# Patient Record
Sex: MALE | Race: WHITE | HISPANIC OR LATINO | Employment: UNEMPLOYED | URBAN - METROPOLITAN AREA
[De-identification: names, ages, dates, MRNs, and addresses within clinical notes are randomized per-mention and may not be internally consistent; named-entity substitution may affect disease eponyms.]

---

## 2020-04-14 ENCOUNTER — HOSPITAL ENCOUNTER (OUTPATIENT)
Facility: HOSPITAL | Age: 55
Discharge: HOME | End: 2020-04-15
Attending: EMERGENCY MEDICINE | Admitting: ORTHOPAEDIC SURGERY
Payer: OTHER GOVERNMENT

## 2020-04-14 DIAGNOSIS — M79.5 FOREIGN BODY (FB) IN SOFT TISSUE: ICD-10-CM

## 2020-04-14 DIAGNOSIS — L03.019 CELLULITIS OF THUMB, UNSPECIFIED LATERALITY: Primary | ICD-10-CM

## 2020-04-14 LAB
ANION GAP SERPL CALC-SCNC: 13 MMOL/L (ref 8–16)
BASOPHILS # BLD AUTO: 0.06 K/UL (ref 0–0.2)
BASOPHILS NFR BLD: 0.5 % (ref 0–1.9)
BUN SERPL-MCNC: 13 MG/DL (ref 6–20)
CALCIUM SERPL-MCNC: 9.5 MG/DL (ref 8.7–10.5)
CHLORIDE SERPL-SCNC: 99 MMOL/L (ref 95–110)
CO2 SERPL-SCNC: 25 MMOL/L (ref 23–29)
CREAT SERPL-MCNC: 0.6 MG/DL (ref 0.5–1.4)
DIFFERENTIAL METHOD: ABNORMAL
EOSINOPHIL # BLD AUTO: 0.8 K/UL (ref 0–0.5)
EOSINOPHIL NFR BLD: 6.8 % (ref 0–8)
ERYTHROCYTE [DISTWIDTH] IN BLOOD BY AUTOMATED COUNT: 11.9 % (ref 11.5–14.5)
EST. GFR  (AFRICAN AMERICAN): >60 ML/MIN/1.73 M^2
EST. GFR  (NON AFRICAN AMERICAN): >60 ML/MIN/1.73 M^2
GLUCOSE SERPL-MCNC: 124 MG/DL (ref 70–110)
GLUCOSE SERPL-MCNC: 156 MG/DL (ref 70–110)
HCT VFR BLD AUTO: 43.4 % (ref 40–54)
HGB BLD-MCNC: 14.6 G/DL (ref 14–18)
IMM GRANULOCYTES # BLD AUTO: 0.08 K/UL (ref 0–0.04)
IMM GRANULOCYTES NFR BLD AUTO: 0.7 % (ref 0–0.5)
LYMPHOCYTES # BLD AUTO: 2.2 K/UL (ref 1–4.8)
LYMPHOCYTES NFR BLD: 18.4 % (ref 18–48)
MCH RBC QN AUTO: 28.4 PG (ref 27–31)
MCHC RBC AUTO-ENTMCNC: 33.6 G/DL (ref 32–36)
MCV RBC AUTO: 84 FL (ref 82–98)
MONOCYTES # BLD AUTO: 0.9 K/UL (ref 0.3–1)
MONOCYTES NFR BLD: 7.3 % (ref 4–15)
NEUTROPHILS # BLD AUTO: 8.1 K/UL (ref 1.8–7.7)
NEUTROPHILS NFR BLD: 66.3 % (ref 38–73)
NRBC BLD-RTO: 0 /100 WBC
PLATELET # BLD AUTO: 206 K/UL (ref 150–350)
PMV BLD AUTO: 12.2 FL (ref 9.2–12.9)
POTASSIUM SERPL-SCNC: 3.7 MMOL/L (ref 3.5–5.1)
RBC # BLD AUTO: 5.14 M/UL (ref 4.6–6.2)
SARS-COV-2 RDRP RESP QL NAA+PROBE: NEGATIVE
SODIUM SERPL-SCNC: 137 MMOL/L (ref 136–145)
WBC # BLD AUTO: 12.17 K/UL (ref 3.9–12.7)

## 2020-04-14 PROCEDURE — 63600175 PHARM REV CODE 636 W HCPCS: Mod: JG | Performed by: INTERNAL MEDICINE

## 2020-04-14 PROCEDURE — 96375 TX/PRO/DX INJ NEW DRUG ADDON: CPT

## 2020-04-14 PROCEDURE — 36415 COLL VENOUS BLD VENIPUNCTURE: CPT

## 2020-04-14 PROCEDURE — 85025 COMPLETE CBC W/AUTO DIFF WBC: CPT

## 2020-04-14 PROCEDURE — G0378 HOSPITAL OBSERVATION PER HR: HCPCS

## 2020-04-14 PROCEDURE — 25000003 PHARM REV CODE 250: Performed by: INTERNAL MEDICINE

## 2020-04-14 PROCEDURE — 87040 BLOOD CULTURE FOR BACTERIA: CPT

## 2020-04-14 PROCEDURE — 96366 THER/PROPH/DIAG IV INF ADDON: CPT

## 2020-04-14 PROCEDURE — 90471 IMMUNIZATION ADMIN: CPT | Performed by: EMERGENCY MEDICINE

## 2020-04-14 PROCEDURE — 80048 BASIC METABOLIC PNL TOTAL CA: CPT

## 2020-04-14 PROCEDURE — 96365 THER/PROPH/DIAG IV INF INIT: CPT

## 2020-04-14 PROCEDURE — 90715 TDAP VACCINE 7 YRS/> IM: CPT | Performed by: EMERGENCY MEDICINE

## 2020-04-14 PROCEDURE — U0002 COVID-19 LAB TEST NON-CDC: HCPCS

## 2020-04-14 PROCEDURE — 63600175 PHARM REV CODE 636 W HCPCS: Performed by: EMERGENCY MEDICINE

## 2020-04-14 PROCEDURE — 99285 EMERGENCY DEPT VISIT HI MDM: CPT | Mod: 25

## 2020-04-14 RX ORDER — POTASSIUM CHLORIDE 7.45 MG/ML
40 INJECTION INTRAVENOUS
Status: DISCONTINUED | OUTPATIENT
Start: 2020-04-14 | End: 2020-04-15 | Stop reason: HOSPADM

## 2020-04-14 RX ORDER — SODIUM CHLORIDE 0.9 % (FLUSH) 0.9 %
10 SYRINGE (ML) INJECTION
Status: DISCONTINUED | OUTPATIENT
Start: 2020-04-14 | End: 2020-04-15 | Stop reason: HOSPADM

## 2020-04-14 RX ORDER — CALCIUM CHLORIDE IN 0.9 % NACL 1 G/100 ML
1 INTRAVENOUS SOLUTION, PIGGYBACK (ML) INTRAVENOUS
Status: DISCONTINUED | OUTPATIENT
Start: 2020-04-14 | End: 2020-04-15 | Stop reason: HOSPADM

## 2020-04-14 RX ORDER — POTASSIUM CHLORIDE 20 MEQ/1
20 TABLET, EXTENDED RELEASE ORAL
Status: DISCONTINUED | OUTPATIENT
Start: 2020-04-14 | End: 2020-04-15 | Stop reason: HOSPADM

## 2020-04-14 RX ORDER — HYDROCODONE BITARTRATE AND ACETAMINOPHEN 5; 325 MG/1; MG/1
1 TABLET ORAL EVERY 6 HOURS PRN
Status: DISCONTINUED | OUTPATIENT
Start: 2020-04-14 | End: 2020-04-15 | Stop reason: HOSPADM

## 2020-04-14 RX ORDER — POTASSIUM CHLORIDE 7.45 MG/ML
20 INJECTION INTRAVENOUS
Status: DISCONTINUED | OUTPATIENT
Start: 2020-04-14 | End: 2020-04-15 | Stop reason: HOSPADM

## 2020-04-14 RX ORDER — VANCOMYCIN HCL IN 5 % DEXTROSE 1G/250ML
1000 PLASTIC BAG, INJECTION (ML) INTRAVENOUS ONCE
Status: DISCONTINUED | OUTPATIENT
Start: 2020-04-14 | End: 2020-04-15 | Stop reason: HOSPADM

## 2020-04-14 RX ORDER — MAGNESIUM SULFATE HEPTAHYDRATE 40 MG/ML
4 INJECTION, SOLUTION INTRAVENOUS
Status: DISCONTINUED | OUTPATIENT
Start: 2020-04-14 | End: 2020-04-15 | Stop reason: HOSPADM

## 2020-04-14 RX ORDER — MAGNESIUM SULFATE HEPTAHYDRATE 40 MG/ML
2 INJECTION, SOLUTION INTRAVENOUS
Status: DISCONTINUED | OUTPATIENT
Start: 2020-04-14 | End: 2020-04-15 | Stop reason: HOSPADM

## 2020-04-14 RX ORDER — ACETAMINOPHEN 325 MG/1
650 TABLET ORAL EVERY 4 HOURS PRN
Status: DISCONTINUED | OUTPATIENT
Start: 2020-04-14 | End: 2020-04-15 | Stop reason: HOSPADM

## 2020-04-14 RX ORDER — ONDANSETRON 2 MG/ML
4 INJECTION INTRAMUSCULAR; INTRAVENOUS EVERY 8 HOURS PRN
Status: DISCONTINUED | OUTPATIENT
Start: 2020-04-14 | End: 2020-04-15 | Stop reason: HOSPADM

## 2020-04-14 RX ORDER — POTASSIUM CHLORIDE 20 MEQ/1
40 TABLET, EXTENDED RELEASE ORAL
Status: DISCONTINUED | OUTPATIENT
Start: 2020-04-14 | End: 2020-04-15 | Stop reason: HOSPADM

## 2020-04-14 RX ORDER — MAGNESIUM SULFATE 1 G/100ML
1 INJECTION INTRAVENOUS
Status: DISCONTINUED | OUTPATIENT
Start: 2020-04-14 | End: 2020-04-15 | Stop reason: HOSPADM

## 2020-04-14 RX ORDER — LANOLIN ALCOHOL/MO/W.PET/CERES
800 CREAM (GRAM) TOPICAL
Status: DISCONTINUED | OUTPATIENT
Start: 2020-04-14 | End: 2020-04-15 | Stop reason: HOSPADM

## 2020-04-14 RX ORDER — VANCOMYCIN HCL IN 5 % DEXTROSE 1G/250ML
1000 PLASTIC BAG, INJECTION (ML) INTRAVENOUS ONCE
Status: COMPLETED | OUTPATIENT
Start: 2020-04-14 | End: 2020-04-14

## 2020-04-14 RX ADMIN — TETANUS IMMUNE GLOBULIN (HUMAN) 250 UNITS: 250 INJECTION INTRAMUSCULAR at 09:04

## 2020-04-14 RX ADMIN — PIPERACILLIN SODIUM AND TAZOBACTAM SODIUM 3.38 G: 3; .375 INJECTION, POWDER, LYOPHILIZED, FOR SOLUTION INTRAVENOUS at 05:04

## 2020-04-14 RX ADMIN — POTASSIUM CHLORIDE 20 MEQ: 20 TABLET, EXTENDED RELEASE ORAL at 09:04

## 2020-04-14 RX ADMIN — HYDROCODONE BITARTRATE AND ACETAMINOPHEN 1 TABLET: 5; 325 TABLET ORAL at 09:04

## 2020-04-14 RX ADMIN — VANCOMYCIN HYDROCHLORIDE 500 MG: 500 INJECTION, POWDER, LYOPHILIZED, FOR SOLUTION INTRAVENOUS at 05:04

## 2020-04-14 RX ADMIN — VANCOMYCIN HYDROCHLORIDE 1000 MG: 1 INJECTION, POWDER, LYOPHILIZED, FOR SOLUTION INTRAVENOUS at 05:04

## 2020-04-14 RX ADMIN — CLOSTRIDIUM TETANI TOXOID ANTIGEN (FORMALDEHYDE INACTIVATED), CORYNEBACTERIUM DIPHTHERIAE TOXOID ANTIGEN (FORMALDEHYDE INACTIVATED), BORDETELLA PERTUSSIS TOXOID ANTIGEN (GLUTARALDEHYDE INACTIVATED), BORDETELLA PERTUSSIS FILAMENTOUS HEMAGGLUTININ ANTIGEN (FORMALDEHYDE INACTIVATED), BORDETELLA PERTUSSIS PERTACTIN ANTIGEN, AND BORDETELLA PERTUSSIS FIMBRIAE 2/3 ANTIGEN 0.5 ML: 5; 2; 2.5; 5; 3; 5 INJECTION, SUSPENSION INTRAMUSCULAR at 05:04

## 2020-04-14 NOTE — ED NOTES
LOC: The patient is awake, alert and aware of environment with an appropriate affect, the patient is oriented x 3 and speaking appropriately.  APPEARANCE: Patient resting comfortably and in no acute distress, patient is clean and well groomed, patient's clothing properly fastened.  SKIN: The skin is warm and dry, color consistent with ethnicity, patient has normal skin turgor and moist mucus membranes, skin intact, no breakdown or brusing noted. Swelling noted to R thumb.  MUSKULOSKELETAL: Patient moving all extremities well, no obvious swelling or deformities noted.  RESPIRATORY: Airway is open and patent, respirations are spontaneous, patient has a normal effort and rate, no accessory muscle use noted.  CARDIAC: Patient has a normal rate and rhythm, no peripheral edema noted, capillary refill < 3 seconds.  ABDOMEN: Soft and non tender to palpation, no distention noted.  NEUROLOGIC: PERRL, 3mm bilaterally, eyes open spontaneously, behavior appropriate to situation, follows commands, facial expression symmetrical, bilateral hand grasp equal and even, purposeful motor response noted, normal sensation in all extremities when touched with a finger.

## 2020-04-14 NOTE — ED PROVIDER NOTES
Encounter Date: 4/14/2020       History     Chief Complaint   Patient presents with    FINGER SWELLING     SPLINTER TO RT THUMB X 8 DAYS AGO     54-year-old  male was a benign past medical history, presents emergency room with complaints of swelling and pain to his right thumb of 8 days duration.  The patient states that with his initial injury he had a piece of wood impaled in the volar aspect of the thumb overlying the proximal phalanx which was removed but may have very well had a retained piece.  Since that time he has had increasing pain and swelling to that area with limited range of motion.  No lymphangitis or fever.  The patient is not diabetic.  No complaints of any pain to the proximal forearm.  No complaints of pain to the remaining fingers or metacarpal area.  Tetanus status is not up-to-date.        Review of patient's allergies indicates:  No Known Allergies  Past Medical History:   Diagnosis Date    Diabetes mellitus      No past surgical history on file.  No family history on file.  Social History     Tobacco Use    Smoking status: Never Smoker   Substance Use Topics    Alcohol use: No    Drug use: No     Review of Systems   Constitutional: Negative for chills and fever.   Musculoskeletal: Positive for arthralgias and joint swelling.   Skin: Positive for wound.   All other systems reviewed and are negative.      Physical Exam     Initial Vitals [04/14/20 1456]   BP Pulse Resp Temp SpO2   (!) 167/87 89 16 98.8 °F (37.1 °C) 98 %      MAP       --         Physical Exam    Musculoskeletal:        Right hand: Right thumb: Exhibits decreased ROM, swelling and tenderness. Injuries: foreign body and puncture wound. Right index finger: Right index finger - findings: Normal. Right middle finger: Right middle finger - findings: Normal. Right ring finger: Right ring finger - findings: Normal. Right little finger: Right little finger - findings: Normal.         ED Course   Procedures  Labs Reviewed -  No data to display       Imaging Results          US Soft Tissue Head Neck Thyroid (In process)                  Medical Decision Making:   ED Management:  Patient consulted Dr. Reeder who will see the patient in the morning.  Agrees with IV antibiotics.              Attending Attestation:             Attending ED Notes:   The patient's care is being transition to Dr. Paulson                        Clinical Impression:       ICD-10-CM ICD-9-CM   1. Cellulitis of thumb, unspecified laterality L03.019 681.00   2. Foreign body (FB) in soft tissue M79.5 729.6                                Dante Paulson MD  04/14/20 175

## 2020-04-14 NOTE — H&P
"[facility] Medicine H&P    Date of Admit: 4/14/2020  Date of Transfer: 4/14/2020      Subjective:      History of Present Illness / Brief Hospital Course:  Fareed Mcneal is a 54 y.o. male who  has a past medical history of Diabetes mellitus.. The patient presented to the on 4/14/2020 with a primary complaint of FINGER SWELLING (SPLINTER TO RT THUMB X 8 DAYS AGO)      The patient was in their usual state of health until about 1 week ago when he was working and got a splinter in his thumb. He did initially have it removed but since then has had progressive pain, swelling, and erythema to his proximal thumb.   He received a tdap vaccine in the ED. He does not know if he's ever received the vaccination before.   Denies fevers/chills, cp, sob, n/v/d, abd pain.        Past Medical History:  Past Medical History:   Diagnosis Date    Diabetes mellitus      Past Surgical History:  No past surgical history on file.    Allergies:  Review of patient's allergies indicates:  No Known Allergies    Home Medications:  Prior to Admission medications    Medication Sig Start Date End Date Taking? Authorizing Provider   blood sugar diagnostic Strp 1 each by Misc.(Non-Drug; Combo Route) route 2 hours after meals and at bedtime. 3/3/15   Linus De La Rosa MD   blood-glucose meter kit Use as instructed 3/3/15 3/2/16  Linus De La Rosa MD   hydrocodone-acetaminophen 5-325mg (NORCO) 5-325 mg per tablet Take 1 tablet by mouth every 6 (six) hours as needed for Pain. 3/3/15   Linus De La Rosa MD   insulin NPH-insulin regular, 70/30, (NOVOLIN 70/30) 100 unit/mL (70-30) injection Inject 16 Units into the skin 2 (two) times daily before meals. 3/3/15 3/2/16  Linus De La Rosa MD   insulin syringe-needle U-100 (BD INSULIN SYRINGE ULTRA-FINE) 1 mL 31 x 5/16" Syrg 1 each by Misc.(Non-Drug; Combo Route) route 2 (two) times daily with meals. 3/3/15   Linus De La Rosa MD   lancets (ONE TOUCH ULTRASOFT LANCETS) Misc 1 each by Misc.(Non-Drug; Combo " "Route) route 2 hours after meals and at bedtime. 3/3/15   Linus De La Rosa MD   metformin (GLUCOPHAGE) 500 MG tablet Take 1 tablet (500 mg total) by mouth 2 (two) times daily with meals. 3/3/15 3/2/16  Linus De La Rosa MD       Family History:  Reviewed, non-contributory     Social History:  Social History     Tobacco Use    Smoking status: Never Smoker   Substance Use Topics    Alcohol use: No    Drug use: No       Review of Systems:  Pertinent items are noted in HPI. All other systems are reviewed and are negative.     Objective:   Last 24 Hour Vital Signs:  BP  Min: 157/91  Max: 174/96  Temp  Av.8 °F (37.1 °C)  Min: 98.8 °F (37.1 °C)  Max: 98.8 °F (37.1 °C)  Pulse  Av  Min: 89  Max: 91  Resp  Av  Min: 16  Max: 16  SpO2  Av.8 %  Min: 98 %  Max: 99 %  Height  Av' 5" (165.1 cm)  Min: 5' 5" (165.1 cm)  Max: 5' 5" (165.1 cm)  Weight  Av.4 kg (175 lb)  Min: 79.4 kg (175 lb)  Max: 79.4 kg (175 lb)  Body mass index is 29.12 kg/m².  No intake/output data recorded.    Physical Examination:  Physical Exam   Constitutional: He is oriented to person, place, and time. He appears well-developed and well-nourished.   HENT:   Head: Normocephalic and atraumatic.   Eyes: Pupils are equal, round, and reactive to light. No scleral icterus.   Neck: Normal range of motion.   Cardiovascular: Normal rate and regular rhythm.   Pulmonary/Chest: Effort normal. No respiratory distress.   Abdominal: Soft. He exhibits no distension. There is no tenderness.   Musculoskeletal:   R thumb with mild swelling and erythema, no drainage  +TTP   Neurological: He is alert and oriented to person, place, and time. He exhibits normal muscle tone.   Skin: Skin is warm and dry. Capillary refill takes less than 2 seconds.   Vitals reviewed.      Laboratory:  Most Recent Data:  CBC:   Lab Results   Component Value Date    WBC 12.23 2015    HGB 14.9 2015    HCT 41.9 2015     2015    MCV 85 " 03/03/2015    RDW 11.8 03/03/2015       BMP:   Lab Results   Component Value Date     (L) 03/03/2015    K 3.4 (L) 03/03/2015    CL 99 03/03/2015    CO2 23 03/03/2015    BUN 13 03/03/2015     (H) 03/03/2015    CALCIUM 8.8 03/03/2015    MG 2.0 03/03/2015    PHOS 3.6 03/03/2015     LFTs:   Lab Results   Component Value Date    PROT 7.6 03/02/2015    ALBUMIN 3.5 03/02/2015    BILITOT 1.0 03/02/2015    AST 25 03/02/2015    ALKPHOS 74 03/02/2015    ALT 39 03/02/2015     Coags:   Lab Results   Component Value Date    INR 1.1 02/26/2015     DM:   Lab Results   Component Value Date    HGBA1C 12.5 (H) 02/27/2015    CREATININE 0.8 03/03/2015     Urinalysis:   Lab Results   Component Value Date    COLORU Yellow 02/26/2015    SPECGRAV 1.015 02/26/2015    NITRITE Negative 02/26/2015    KETONESU Negative 02/26/2015    UROBILINOGEN Negative 02/26/2015    WBCUA 1 02/26/2015     Radiology:  Imaging Results          US Soft Tissue Head Neck Thyroid (Final result)  Result time 04/14/20 15:45:41    Final result by Shaheen Nicholas MD (04/14/20 15:45:41)                 Impression:      1. No foreign body identified.  2. Mild subcutaneous edema and increased vascularity may reflect cellulitis.      Electronically signed by: Shaheen Nicholas MD  Date:    04/14/2020  Time:    15:45             Narrative:    EXAMINATION:  US SOFT TISSUE HEAD NECK THYROID    CLINICAL HISTORY:  Residual foreign body in soft tissue    COMPARISON:  None    FINDINGS:  Focused ultrasound of the right thumb.  No foreign bodies identified.  There is mild subcutaneous edema in the evaluated soft tissues and increased vascularity.                                 Assessment/plan:     Fareed Mcneal is a 54 y.o. male with:      Cellulitis/puncture wound to R thumb  - continue broad spectrum abx w/ vanc and zosyn  - surgery evaluation  - received tdap vaccine, will give tetanus immune globulin as well given uncertain vaccination status     Uncontrolled DMII  -  hold metformin   - SSI    Hypokalemia   - replace       Jd Beal

## 2020-04-15 ENCOUNTER — TELEPHONE (OUTPATIENT)
Dept: ORTHOPEDICS | Facility: CLINIC | Age: 55
End: 2020-04-15

## 2020-04-15 VITALS
WEIGHT: 174.81 LBS | HEIGHT: 65 IN | TEMPERATURE: 98 F | OXYGEN SATURATION: 97 % | BODY MASS INDEX: 29.12 KG/M2 | DIASTOLIC BLOOD PRESSURE: 91 MMHG | HEART RATE: 89 BPM | RESPIRATION RATE: 18 BRPM | SYSTOLIC BLOOD PRESSURE: 148 MMHG

## 2020-04-15 LAB
ANION GAP SERPL CALC-SCNC: 14 MMOL/L (ref 8–16)
BASOPHILS # BLD AUTO: 0.05 K/UL (ref 0–0.2)
BASOPHILS NFR BLD: 0.5 % (ref 0–1.9)
BUN SERPL-MCNC: 13 MG/DL (ref 6–20)
CALCIUM SERPL-MCNC: 8.8 MG/DL (ref 8.7–10.5)
CHLORIDE SERPL-SCNC: 93 MMOL/L (ref 95–110)
CO2 SERPL-SCNC: 26 MMOL/L (ref 23–29)
CREAT SERPL-MCNC: 0.8 MG/DL (ref 0.5–1.4)
DIFFERENTIAL METHOD: ABNORMAL
EOSINOPHIL # BLD AUTO: 0.6 K/UL (ref 0–0.5)
EOSINOPHIL NFR BLD: 6.1 % (ref 0–8)
ERYTHROCYTE [DISTWIDTH] IN BLOOD BY AUTOMATED COUNT: 12.2 % (ref 11.5–14.5)
EST. GFR  (AFRICAN AMERICAN): >60 ML/MIN/1.73 M^2
EST. GFR  (NON AFRICAN AMERICAN): >60 ML/MIN/1.73 M^2
ESTIMATED AVG GLUCOSE: 163 MG/DL (ref 68–131)
GLUCOSE SERPL-MCNC: 190 MG/DL (ref 70–110)
GLUCOSE SERPL-MCNC: 218 MG/DL (ref 70–110)
GLUCOSE SERPL-MCNC: 223 MG/DL (ref 70–110)
HBA1C MFR BLD HPLC: 7.3 % (ref 4.5–6.2)
HCT VFR BLD AUTO: 40.5 % (ref 40–54)
HGB BLD-MCNC: 13.7 G/DL (ref 14–18)
IMM GRANULOCYTES # BLD AUTO: 0.05 K/UL (ref 0–0.04)
IMM GRANULOCYTES NFR BLD AUTO: 0.5 % (ref 0–0.5)
LYMPHOCYTES # BLD AUTO: 1.8 K/UL (ref 1–4.8)
LYMPHOCYTES NFR BLD: 20.1 % (ref 18–48)
MCH RBC QN AUTO: 28.6 PG (ref 27–31)
MCHC RBC AUTO-ENTMCNC: 33.8 G/DL (ref 32–36)
MCV RBC AUTO: 85 FL (ref 82–98)
MONOCYTES # BLD AUTO: 0.8 K/UL (ref 0.3–1)
MONOCYTES NFR BLD: 8.6 % (ref 4–15)
NEUTROPHILS # BLD AUTO: 5.9 K/UL (ref 1.8–7.7)
NEUTROPHILS NFR BLD: 64.2 % (ref 38–73)
NRBC BLD-RTO: 0 /100 WBC
PLATELET # BLD AUTO: 216 K/UL (ref 150–350)
PMV BLD AUTO: 12.7 FL (ref 9.2–12.9)
POTASSIUM SERPL-SCNC: 3.7 MMOL/L (ref 3.5–5.1)
RBC # BLD AUTO: 4.79 M/UL (ref 4.6–6.2)
SODIUM SERPL-SCNC: 133 MMOL/L (ref 136–145)
WBC # BLD AUTO: 9.17 K/UL (ref 3.9–12.7)

## 2020-04-15 PROCEDURE — 63600175 PHARM REV CODE 636 W HCPCS: Performed by: INTERNAL MEDICINE

## 2020-04-15 PROCEDURE — 96372 THER/PROPH/DIAG INJ SC/IM: CPT | Mod: 59

## 2020-04-15 PROCEDURE — 99218 PR INITIAL OBSERVATION CARE,LEVL I: ICD-10-PCS | Mod: ,,, | Performed by: ORTHOPAEDIC SURGERY

## 2020-04-15 PROCEDURE — 80048 BASIC METABOLIC PNL TOTAL CA: CPT

## 2020-04-15 PROCEDURE — 96376 TX/PRO/DX INJ SAME DRUG ADON: CPT

## 2020-04-15 PROCEDURE — 83036 HEMOGLOBIN GLYCOSYLATED A1C: CPT

## 2020-04-15 PROCEDURE — 99218 PR INITIAL OBSERVATION CARE,LEVL I: CPT | Mod: ,,, | Performed by: ORTHOPAEDIC SURGERY

## 2020-04-15 PROCEDURE — 96366 THER/PROPH/DIAG IV INF ADDON: CPT

## 2020-04-15 PROCEDURE — 36415 COLL VENOUS BLD VENIPUNCTURE: CPT

## 2020-04-15 PROCEDURE — 96367 TX/PROPH/DG ADDL SEQ IV INF: CPT

## 2020-04-15 PROCEDURE — G0378 HOSPITAL OBSERVATION PER HR: HCPCS

## 2020-04-15 PROCEDURE — 85025 COMPLETE CBC W/AUTO DIFF WBC: CPT

## 2020-04-15 RX ORDER — DOXYCYCLINE 100 MG/1
100 CAPSULE ORAL 2 TIMES DAILY
Qty: 20 CAPSULE | Refills: 0 | Status: SHIPPED | OUTPATIENT
Start: 2020-04-15

## 2020-04-15 RX ADMIN — PIPERACILLIN AND TAZOBACTAM 3.38 G: 3; .375 INJECTION, POWDER, FOR SOLUTION INTRAVENOUS at 02:04

## 2020-04-15 RX ADMIN — HUMAN INSULIN 4 UNITS: 100 INJECTION, SOLUTION SUBCUTANEOUS at 12:04

## 2020-04-15 RX ADMIN — VANCOMYCIN HYDROCHLORIDE 1250 MG: 1 INJECTION, POWDER, LYOPHILIZED, FOR SOLUTION INTRAVENOUS at 05:04

## 2020-04-15 RX ADMIN — PIPERACILLIN AND TAZOBACTAM 3.38 G: 3; .375 INJECTION, POWDER, FOR SOLUTION INTRAVENOUS at 09:04

## 2020-04-15 NOTE — NURSING
Spoke with patient's family member who speaks English, [email or web address].  Discharge instructions given.  Verbalized understanding and is on her way to pick patient up.

## 2020-04-15 NOTE — PROGRESS NOTES
VANCOMYCIN PHARMACOKINETIC NOTE:  Vancomycin Day # 1    Objective/Assessment:    Diagnosis/Indication for Vancomycin: Cellulitis     54 y.o., male; Actual Body Weight = 79.4 kg (175 lb).    The patient has the following labs:  4/14/2020 Estimated Creatinine Clearance: 136.8 mL/min (based on SCr of 0.6 mg/dL). Lab Results   Component Value Date    BUN 13 04/14/2020       Lab Results   Component Value Date    WBC 12.17 04/14/2020          Plan:  Adjust vancomycin dose and/or frequency based on the patient's actual weight and renal function:  Initiate Vancomycin 1500 mg then 1250 mg IV every 12 hours.  Orders have been entered into patient's chart.      Vancomycin trough level has been ordered for 4/16/20 @ 4:00 AM.    Pharmacy will manage vancomycin therapy, monitor serum vancomycin levels, monitor renal function and adjust regimen as necessary.    Thank you for allowing us to participate in this patient's care.     Maxime Tomlinson 4/14/2020 8:02 PM  Department of Pharmacy  Ext [phone]

## 2020-04-15 NOTE — CONSULTS
Past Medical History:   Diagnosis Date    Cataracts, bilateral     Diabetes mellitus     Gastritis        History reviewed. No pertinent surgical history.    Current Facility-Administered Medications   Medication    acetaminophen tablet 650 mg    calcium chloride 1g in sodium chloride 0.9% 100mL (ready to mix system)    calcium chloride 1g in sodium chloride 0.9% 100mL (ready to mix system)    calcium chloride 1g in sodium chloride 0.9% 100mL (ready to mix system)    dextrose 50% injection 12.5 g    dextrose 50% injection 25 g    HYDROcodone-acetaminophen 5-325 mg per tablet 1 tablet    insulin regular injection 1-12 Units    magnesium oxide tablet 800 mg    magnesium sulfate 2g in water 50mL IVPB (premix)    magnesium sulfate 2g in water 50mL IVPB (premix)    magnesium sulfate 2g in water 50mL IVPB (premix)    magnesium sulfate in dextrose IVPB (premix) 1 g    ondansetron injection 4 mg    piperacillin-tazobactam 3.375 g in dextrose 5 % 50 mL IVPB (ready to mix system)    potassium chloride 10 mEq in 100 mL IVPB    potassium chloride 10 mEq in 100 mL IVPB    potassium chloride 10 mEq in 100 mL IVPB    potassium chloride 10 mEq in 100 mL IVPB    potassium chloride SA CR tablet 20 mEq    potassium chloride SA CR tablet 20 mEq    potassium chloride SA CR tablet 40 mEq    potassium chloride SA CR tablet 40 mEq    sodium chloride 0.9% flush 10 mL    sodium phosphate 15 mmol in dextrose 5 % 250 mL IVPB    sodium phosphate 15 mmol in dextrose 5 % 250 mL IVPB    sodium phosphate 20.01 mmol in dextrose 5 % 250 mL IVPB    sodium phosphate 20.01 mmol in dextrose 5 % 250 mL IVPB    sodium phosphate 30 mmol in dextrose 5 % 250 mL IVPB    vancomycin (VANCOCIN) 1,250 mg in dextrose 5 % 250 mL IVPB    vancomycin - pharmacy to dose    vancomycin in dextrose 5 % 1 gram/250 mL IVPB 1,000 mg    Followed by    vancomycin 500 mg in dextrose 5 % 100 mL IVPB (ready to mix system)       Review of  patient's allergies indicates:  No Known Allergies    History reviewed. No pertinent family history.    Social History     Socioeconomic History    Marital status: Single     Spouse name: Not on file    Number of children: Not on file    Years of education: Not on file    Highest education level: Not on file   Occupational History    Not on file   Social Needs    Financial resource strain: Not on file    Food insecurity:     Worry: Not on file     Inability: Not on file    Transportation needs:     Medical: Not on file     Non-medical: Not on file   Tobacco Use    Smoking status: Never Smoker   Substance and Sexual Activity    Alcohol use: No    Drug use: No    Sexual activity: Not on file   Lifestyle    Physical activity:     Days per week: Not on file     Minutes per session: Not on file    Stress: Not on file   Relationships    Social connections:     Talks on phone: Not on file     Gets together: Not on file     Attends Hinduism service: Not on file     Active member of club or organization: Not on file     Attends meetings of clubs or organizations: Not on file     Relationship status: Not on file   Other Topics Concern    Not on file   Social History Narrative    Not on file       Chief Complaint:   Chief Complaint   Patient presents with    FINGER SWELLING     SPLINTER TO RT THUMB X 8 DAYS AGO       Consulting Physician: Self, Aaareferral    History of present illness:    This is a 54 y.o. year old male who complains of right thumb pain and swelling. Reports he had a wood splinter over a week ago and removed it at the time. He reports increasing redness, pain and swelling since. Denies drainage.     Exam conducted with assistance of online  ([employer]).    Review of Systems:    Constitution:   Denies chills, fever, and sweats.  HENT:   Denies headaches or blurry vision.  Cardiovascular:  Denies chest pain or irregular heart beat.  Respiratory:   Denies cough or shortness of  "breath.  Gastrointestinal:  Denies abdominal pain, nausea, or vomiting.  Musculoskeletal:   Denies muscle cramps.  Neurological:   Denies dizziness or focal weakness.  Psychiatric/Behavior: Normal mental status.  Hematology/Lymph:  Denies bleeding problem or easy bruising/bleeding.  Skin:    Denies rash or suspicious lesions.    Examination:    Vital Signs:    Vitals:    04/14/20 2137 04/15/20 0235 04/15/20 0238 04/15/20 0725   BP: (!) 139/90 137/78 137/78 123/76   Pulse: 95 80 80 84   Resp: 18 18 18 16   Temp: 97.9 °F (36.6 °C) 98.6 °F (37 °C) 98.6 °F (37 °C) 98.5 °F (36.9 °C)   TempSrc: Oral Oral Oral Oral   SpO2: 100% 100% 100% 98%   Weight: 79.3 kg (174 lb 13.2 oz)      Height: 5' 5" (1.651 m)          Body mass index is 29.09 kg/m².    Constitution:   Well-developed, well nourished patient in no acute distress.  Neurological:   Alert and oriented x 3 and cooperative to examination.     Psychiatric/Behavior: Normal mental status.  Respiratory:   No shortness of breath.  Eyes:    Extraoccular muscles intact  Skin:    No scars, rash or suspicious lesions.    Physical Exam: Right Hand Exam    Skin  Scars:   5mm area of healed wound with callus volar thumb over proximal phalanx.  Rash:   None    Inspection  Erythema:  None  Bruising:  None  Swelling:  None  Masses:  None  Lymphadenopathy: None    Coordination:  Normal  Instability:  None    Range of Motion  Finger ROM:  Full thumb    Strength:  Normal   Tenderness:  Mild when wound is compressed. No flexor tendon pain.    Pulse:   2+ radial  Capillary Refill: Normal    Sensation:  Intact          Imaging: X-rays ordered and images interpreted today personally by me of right hand show no foreign body or fracture/dislocation. US also negative for foreign body.        Assessment: Cellulitis of thumb, unspecified laterality    Foreign body (FB) in soft tissue  -     US Soft Tissue Head Neck Thyroid; Standing    Other orders  -     Tdap vaccine injection 0.5 mL  -    "  CBC auto differential; Standing  -     Basic metabolic panel; Standing  -     Insert Saline lock IV; Standing  -     piperacillin-tazobactam 3.375 g in dextrose 5 % 50 mL IVPB (ready to mix system)  -     Pharmacy to dose Vancomycin consult; Standing  -     vancomycin - pharmacy to dose  -     Cancel: Inpatient consult to Hospitalist; Standing  -     vancomycin in dextrose 5 % 1 gram/250 mL IVPB 1,000 mg  -     vancomycin 500 mg in dextrose 5 % 100 mL IVPB (ready to mix system)  -     Inpatient consult to Orthopedic Surgery; Standing  -     X-Ray Hand 3 view Right; Standing  -     Possible Hospitalization; Standing  -     Cancel: Inpatient consult to Hospitalist; Standing  -     Blood culture; Standing  -     Blood culture; Standing  -     piperacillin-tazobactam 3.375 g in dextrose 5 % 50 mL IVPB (ready to mix system)  -     vancomycin in dextrose 5 % 1 gram/250 mL IVPB 1,000 mg  -     vancomycin 500 mg in dextrose 5 % 100 mL IVPB (ready to mix system)  -     POCT glucose; Standing  -     insulin regular injection 1-12 Units  -     Nursing communication; Standing  -     dextrose 50% injection 25 g  -     dextrose 50% injection 12.5 g  -     potassium chloride SA CR tablet 20 mEq  -     potassium chloride SA CR tablet 40 mEq  -     potassium chloride SA CR tablet 20 mEq  -     potassium chloride SA CR tablet 40 mEq  -     potassium chloride 10 mEq in 100 mL IVPB  -     potassium chloride 10 mEq in 100 mL IVPB  -     potassium chloride 10 mEq in 100 mL IVPB  -     potassium chloride 10 mEq in 100 mL IVPB  -     magnesium oxide tablet 800 mg  -     magnesium sulfate 2g in water 50mL IVPB (premix)  -     magnesium sulfate in dextrose IVPB (premix) 1 g  -     magnesium sulfate 2g in water 50mL IVPB (premix)  -     magnesium sulfate 2g in water 50mL IVPB (premix)  -     sodium phosphate 15 mmol in dextrose 5 % 250 mL IVPB  -     sodium phosphate 20.01 mmol in dextrose 5 % 250 mL IVPB  -     sodium phosphate 15 mmol in  dextrose 5 % 250 mL IVPB  -     sodium phosphate 30 mmol in dextrose 5 % 250 mL IVPB  -     sodium phosphate 20.01 mmol in dextrose 5 % 250 mL IVPB  -     calcium chloride 1g in sodium chloride 0.9% 100mL (ready to mix system)  -     calcium chloride 1g in sodium chloride 0.9% 100mL (ready to mix system)  -     calcium chloride 1g in sodium chloride 0.9% 100mL (ready to mix system)  -     HYDROcodone-acetaminophen 5-325 mg per tablet 1 tablet  -     Vital Signs; Standing  -     Progressive Mobility Protocol (mobilize patient to their highest level of functioning at least twice daily); Standing  -     Notify Physician; Standing  -     Notify Physician - Potential Need of Opioid Reversal; Standing  -     sodium chloride 0.9% flush 10 mL  -     IP VTE HIGH RISK PATIENT; Standing  -     Full code; Standing  -     Place in Observation; Standing  -     Place sequential compression device; Standing  -     Place IGLESIA hose; Standing  -     Cancel: Diet diabetic [facility]; 2000 Calorie; Standing  -     acetaminophen tablet 650 mg  -     ondansetron injection 4 mg  -     Basic Metabolic Panel (BMP); Standing  -     CBC with Automated Differential; Standing  -     Hemoglobin A1c; Standing  -     COVID-19 Routine Screening; Standing  -     Discontinue: vancomycin (VANCOCIN) 1,250 mg in dextrose 5 % 250 mL IVPB  -     VANCOMYCIN, TROUGH before 4th dose; Standing  -     Cancel: Diet NPO; Standing  -     vancomycin (VANCOCIN) 1,250 mg in dextrose 5 % 250 mL IVPB  -     tetanus immune globulin (PF) injection 250 Units  -     POCT glucose; Standing  -     POCT glucose; Standing  -     Diet diabetic [facility]; 1800 Calorie; Standing        Plan:  Pt reports that thumb feels and looks much better today over yesterday. He has no pain except when we squeeze the healed wound area and even then he reports it is mild discomfort. No flexor involvement. We discussed exploring the wound and he thinks it is not necessary. He would like to go home with oral  antibiotics. He can follow up in my office next Friday 4/24/20.      DISCLAIMER: This note may have been dictated using voice recognition software and may contain grammatical errors.     NOTE: Consult report sent to referring provider via [employer] EMR.

## 2020-04-15 NOTE — NURSING
Rounds made with Dr. Reeder.  JACEK accessed for translation.  Patient did agree with physician to go home on antibiotics and follow up next week at clinic.  Diet ordered.

## 2020-04-15 NOTE — DISCHARGE SUMMARY
[facility] Hospital Medicine Discharge Summary    Date of Admit: 4/14/2020  Date of Discharge: 4/15/2020    Discharge to: Home or Self Care  Condition: good    Discharge Diagnoses     Problem Noted   Cellulitis of Thumb 4/14/2020        Patient Active Problem List   Diagnosis    Traumatic intracranial hemorrhage    ICH (intracerebral hemorrhage)    Skull fracture    Epidural hematoma    Diabetes mellitus type 2, uncontrolled, without complications    Nuclear sclerosis of both eyes    Diabetic macular edema    Preproliferative diabetic retinopathy    Cellulitis of thumb        Brief History of Present Illness      Fareed Mcneal is a 54 y.o. male who  has a past medical history of Cataracts, bilateral, Diabetes mellitus, and Gastritis.  The patient presented to [facility] on 4/14/2020 with a primary complaint of FINGER SWELLING (SPLINTER TO RT THUMB X 8 DAYS AGO)  .       For the full HPI please refer to the History & Physical from this admission.    Hospital Course     Admitted with cellulitis of the R thumb, responded well to abx. Seen by orthopedic surgery and no procedural intervention needed per surgery.   Improving clinically, will d/c home with PO abx.   Advised to f/u with [facility] for primary care and provided with contact info. Will also f/u with orthopedic surgery for wound check.     Physical exam     Constitutional: He is oriented to person, place, and time. He appears well-developed and well-nourished.   HENT:   Head: Normocephalic and atraumatic.   Eyes: Pupils are equal, round, and reactive to light. No scleral icterus.   Neck: Normal range of motion.   Cardiovascular: Normal rate and regular rhythm.   Pulmonary/Chest: Effort normal. No respiratory distress.   Abdominal: Soft. He exhibits no distension. There is no tenderness.   Musculoskeletal:   R thumb with mild swelling and erythema, improved.   no drainage  Very mild TTP, also improved  Full rom and sensation in RUE   Neurological: He is alert and  oriented to person, place, and time. He exhibits normal muscle tone.   Skin: Skin is warm and dry. Capillary refill takes less than 2 seconds.   Vitals reviewed.     Discharge Medications        Medication List      START taking these medications    doxycycline 100 MG Cap  Commonly known as:  VIBRAMYCIN  Take 1 capsule (100 mg total) by mouth 2 (two) times daily.        CONTINUE taking these medications    blood sugar diagnostic Strp  1 each by Misc.(Non-Drug; Combo Route) route 2 hours after meals and at bedtime.     blood-glucose meter kit  Use as instructed     HYDROcodone-acetaminophen 5-325 mg per tablet  Commonly known as:  NORCO  Take 1 tablet by mouth every 6 (six) hours as needed for Pain.     lancets Misc  Commonly known as:  ONETOUCH ULTRASOFT LANCETS  1 each by Misc.(Non-Drug; Combo Route) route 2 hours after meals and at bedtime.     metFORMIN 500 MG tablet  Commonly known as:  GLUCOPHAGE  Take 1 tablet (500 mg total) by mouth 2 (two) times daily with meals.           Where to Get Your Medications      These medications were sent to [facility] [address] [place] [id number] [facility]  [address] [facility] [address]    Phone:  [phone]   · doxycycline 100 MG Cap         Instructions:  1. Take all medications as prescribed  2. Keep all follow-up appointments  3. Return to the hospital or call your primary care physicians for any new, worsening, or recurrent symptoms.    Follow-Up:            Jd Beal MD  1:33 PM  04/15/2020

## 2020-04-15 NOTE — TELEPHONE ENCOUNTER
----- Message from Barbara Hewitt MA sent at 4/15/2020  8:12 AM CDT -----  Contact: [facility]   Nurse Singh calling to inform Dr. Reeder patient is in Room 12[facility] and waiting Dr. Reeder       Please call [phone] Nurse Singh

## 2020-04-15 NOTE — PLAN OF CARE
04/15/20 1337   Final Note   Assessment Type Final Discharge Note   Anticipated Discharge Disposition Home

## 2020-04-19 LAB
BACTERIA BLD CULT: NORMAL
BACTERIA BLD CULT: NORMAL